# Patient Record
Sex: MALE | Race: ASIAN | NOT HISPANIC OR LATINO | URBAN - METROPOLITAN AREA
[De-identification: names, ages, dates, MRNs, and addresses within clinical notes are randomized per-mention and may not be internally consistent; named-entity substitution may affect disease eponyms.]

---

## 2017-04-19 ENCOUNTER — EMERGENCY (EMERGENCY)
Facility: HOSPITAL | Age: 37
LOS: 1 days | Discharge: HOME | End: 2017-04-19
Attending: EMERGENCY MEDICINE | Admitting: EMERGENCY MEDICINE
Payer: COMMERCIAL

## 2017-04-19 VITALS
HEART RATE: 81 BPM | OXYGEN SATURATION: 100 % | TEMPERATURE: 98 F | SYSTOLIC BLOOD PRESSURE: 163 MMHG | RESPIRATION RATE: 18 BRPM | DIASTOLIC BLOOD PRESSURE: 100 MMHG

## 2017-04-19 VITALS
DIASTOLIC BLOOD PRESSURE: 104 MMHG | TEMPERATURE: 98 F | SYSTOLIC BLOOD PRESSURE: 126 MMHG | OXYGEN SATURATION: 100 % | RESPIRATION RATE: 16 BRPM | HEART RATE: 73 BPM

## 2017-04-19 DIAGNOSIS — R10.9 UNSPECIFIED ABDOMINAL PAIN: ICD-10-CM

## 2017-04-19 DIAGNOSIS — I10 ESSENTIAL (PRIMARY) HYPERTENSION: ICD-10-CM

## 2017-04-19 DIAGNOSIS — Z79.899 OTHER LONG TERM (CURRENT) DRUG THERAPY: ICD-10-CM

## 2017-04-19 LAB
ALBUMIN SERPL ELPH-MCNC: 4.4 G/DL — SIGNIFICANT CHANGE UP (ref 3.3–5)
ALP SERPL-CCNC: 92 U/L — SIGNIFICANT CHANGE UP (ref 40–120)
ALT FLD-CCNC: 42 U/L RC — SIGNIFICANT CHANGE UP (ref 10–45)
ANION GAP SERPL CALC-SCNC: 17 MMOL/L — SIGNIFICANT CHANGE UP (ref 5–17)
APPEARANCE UR: CLEAR — SIGNIFICANT CHANGE UP
AST SERPL-CCNC: 25 U/L — SIGNIFICANT CHANGE UP (ref 10–40)
BASOPHILS # BLD AUTO: 0.1 K/UL — SIGNIFICANT CHANGE UP (ref 0–0.2)
BASOPHILS NFR BLD AUTO: 0.6 % — SIGNIFICANT CHANGE UP (ref 0–2)
BILIRUB SERPL-MCNC: 0.8 MG/DL — SIGNIFICANT CHANGE UP (ref 0.2–1.2)
BILIRUB UR-MCNC: NEGATIVE — SIGNIFICANT CHANGE UP
BUN SERPL-MCNC: 13 MG/DL — SIGNIFICANT CHANGE UP (ref 7–23)
CALCIUM SERPL-MCNC: 9.6 MG/DL — SIGNIFICANT CHANGE UP (ref 8.4–10.5)
CHLORIDE SERPL-SCNC: 99 MMOL/L — SIGNIFICANT CHANGE UP (ref 96–108)
CO2 SERPL-SCNC: 25 MMOL/L — SIGNIFICANT CHANGE UP (ref 22–31)
COLOR SPEC: SIGNIFICANT CHANGE UP
CREAT SERPL-MCNC: 1.25 MG/DL — SIGNIFICANT CHANGE UP (ref 0.5–1.3)
DIFF PNL FLD: NEGATIVE — SIGNIFICANT CHANGE UP
EOSINOPHIL # BLD AUTO: 0 K/UL — SIGNIFICANT CHANGE UP (ref 0–0.5)
EOSINOPHIL NFR BLD AUTO: 0.3 % — SIGNIFICANT CHANGE UP (ref 0–6)
GAS PNL BLDV: SIGNIFICANT CHANGE UP
GAS PNL BLDV: SIGNIFICANT CHANGE UP
GLUCOSE SERPL-MCNC: 125 MG/DL — HIGH (ref 70–99)
GLUCOSE UR QL: NEGATIVE — SIGNIFICANT CHANGE UP
HCT VFR BLD CALC: 42.9 % — SIGNIFICANT CHANGE UP (ref 39–50)
HGB BLD-MCNC: 15 G/DL — SIGNIFICANT CHANGE UP (ref 13–17)
KETONES UR-MCNC: NEGATIVE — SIGNIFICANT CHANGE UP
LEUKOCYTE ESTERASE UR-ACNC: NEGATIVE — SIGNIFICANT CHANGE UP
LIDOCAIN IGE QN: 41 U/L — SIGNIFICANT CHANGE UP (ref 7–60)
LYMPHOCYTES # BLD AUTO: 43.6 % — SIGNIFICANT CHANGE UP (ref 13–44)
LYMPHOCYTES # BLD AUTO: 5 K/UL — HIGH (ref 1–3.3)
MCHC RBC-ENTMCNC: 29.9 PG — SIGNIFICANT CHANGE UP (ref 27–34)
MCHC RBC-ENTMCNC: 35 GM/DL — SIGNIFICANT CHANGE UP (ref 32–36)
MCV RBC AUTO: 85.4 FL — SIGNIFICANT CHANGE UP (ref 80–100)
MONOCYTES # BLD AUTO: 0.7 K/UL — SIGNIFICANT CHANGE UP (ref 0–0.9)
MONOCYTES NFR BLD AUTO: 6.5 % — SIGNIFICANT CHANGE UP (ref 2–14)
NEUTROPHILS # BLD AUTO: 5.6 K/UL — SIGNIFICANT CHANGE UP (ref 1.8–7.4)
NEUTROPHILS NFR BLD AUTO: 49 % — SIGNIFICANT CHANGE UP (ref 43–77)
NITRITE UR-MCNC: NEGATIVE — SIGNIFICANT CHANGE UP
PH UR: 6.5 — SIGNIFICANT CHANGE UP (ref 5–8)
PLATELET # BLD AUTO: 226 K/UL — SIGNIFICANT CHANGE UP (ref 150–400)
POTASSIUM SERPL-MCNC: 3.4 MMOL/L — LOW (ref 3.5–5.3)
POTASSIUM SERPL-SCNC: 3.4 MMOL/L — LOW (ref 3.5–5.3)
PROT SERPL-MCNC: 7.7 G/DL — SIGNIFICANT CHANGE UP (ref 6–8.3)
PROT UR-MCNC: NEGATIVE — SIGNIFICANT CHANGE UP
RBC # BLD: 5.02 M/UL — SIGNIFICANT CHANGE UP (ref 4.2–5.8)
RBC # FLD: 11.5 % — SIGNIFICANT CHANGE UP (ref 10.3–14.5)
SODIUM SERPL-SCNC: 141 MMOL/L — SIGNIFICANT CHANGE UP (ref 135–145)
SP GR SPEC: 1.02 — SIGNIFICANT CHANGE UP (ref 1.01–1.02)
UROBILINOGEN FLD QL: NEGATIVE — SIGNIFICANT CHANGE UP
WBC # BLD: 11.3 K/UL — HIGH (ref 3.8–10.5)
WBC # FLD AUTO: 11.3 K/UL — HIGH (ref 3.8–10.5)

## 2017-04-19 PROCEDURE — 93010 ELECTROCARDIOGRAM REPORT: CPT

## 2017-04-19 PROCEDURE — 99285 EMERGENCY DEPT VISIT HI MDM: CPT | Mod: 25

## 2017-04-19 PROCEDURE — 71275 CT ANGIOGRAPHY CHEST: CPT | Mod: 26

## 2017-04-19 PROCEDURE — 74174 CTA ABD&PLVS W/CONTRAST: CPT | Mod: 26

## 2017-04-19 RX ORDER — SODIUM CHLORIDE 9 MG/ML
1000 INJECTION, SOLUTION INTRAVENOUS ONCE
Qty: 0 | Refills: 0 | Status: COMPLETED | OUTPATIENT
Start: 2017-04-19 | End: 2017-04-19

## 2017-04-19 RX ORDER — HYDROMORPHONE HYDROCHLORIDE 2 MG/ML
1 INJECTION INTRAMUSCULAR; INTRAVENOUS; SUBCUTANEOUS ONCE
Qty: 0 | Refills: 0 | Status: DISCONTINUED | OUTPATIENT
Start: 2017-04-19 | End: 2017-04-19

## 2017-04-19 RX ORDER — MORPHINE SULFATE 50 MG/1
4 CAPSULE, EXTENDED RELEASE ORAL ONCE
Qty: 0 | Refills: 0 | Status: DISCONTINUED | OUTPATIENT
Start: 2017-04-19 | End: 2017-04-19

## 2017-04-19 RX ORDER — ACETAMINOPHEN 500 MG
1000 TABLET ORAL ONCE
Qty: 0 | Refills: 0 | Status: COMPLETED | OUTPATIENT
Start: 2017-04-19 | End: 2017-04-19

## 2017-04-19 RX ORDER — POTASSIUM CHLORIDE 20 MEQ
20 PACKET (EA) ORAL ONCE
Qty: 0 | Refills: 0 | Status: COMPLETED | OUTPATIENT
Start: 2017-04-19 | End: 2017-04-19

## 2017-04-19 RX ADMIN — SODIUM CHLORIDE 2000 MILLILITER(S): 9 INJECTION, SOLUTION INTRAVENOUS at 06:40

## 2017-04-19 RX ADMIN — Medication 1000 MILLIGRAM(S): at 04:27

## 2017-04-19 RX ADMIN — Medication 400 MILLIGRAM(S): at 03:23

## 2017-04-19 RX ADMIN — SODIUM CHLORIDE 4000 MILLILITER(S): 9 INJECTION, SOLUTION INTRAVENOUS at 03:24

## 2017-04-19 RX ADMIN — MORPHINE SULFATE 4 MILLIGRAM(S): 50 CAPSULE, EXTENDED RELEASE ORAL at 06:04

## 2017-04-19 RX ADMIN — HYDROMORPHONE HYDROCHLORIDE 1 MILLIGRAM(S): 2 INJECTION INTRAMUSCULAR; INTRAVENOUS; SUBCUTANEOUS at 04:27

## 2017-04-19 RX ADMIN — MORPHINE SULFATE 4 MILLIGRAM(S): 50 CAPSULE, EXTENDED RELEASE ORAL at 03:24

## 2017-04-19 RX ADMIN — HYDROMORPHONE HYDROCHLORIDE 1 MILLIGRAM(S): 2 INJECTION INTRAMUSCULAR; INTRAVENOUS; SUBCUTANEOUS at 04:22

## 2017-04-19 RX ADMIN — Medication 20 MILLIEQUIVALENT(S): at 07:40

## 2017-04-19 RX ADMIN — SODIUM CHLORIDE 4000 MILLILITER(S): 9 INJECTION, SOLUTION INTRAVENOUS at 04:22

## 2017-04-19 NOTE — ED PROCEDURE NOTE - PROCEDURE ADDITIONAL DETAILS
Emergency Department Focused Ultrasound performed at patient's bedside for educational purposes. The study will have a follow up study performed or was performed in the direct supervision of an ultrasound trained attending.       Renal US: no hydronephrosis bilaterally

## 2017-04-19 NOTE — ED ADULT NURSE NOTE - OBJECTIVE STATEMENT
37 y.o M arrived to ED c/o sudden onset abd pain 10/10 at 11PM last night. A&Ox3. Pt states on Saturday he had an episode of abdominal pain over midepigastric area, took zantac and pain was relieved. Today pain began in RLQ nonradiating, took zantac with no relief. PMH HTN. Respirations nonlabored, +abdominal guarding, abdomen soft nondistended, abd pain not increased by plaption, no CVA tenderess, neuro intact, moves all extremities. Denies CP, SOB, N/V/D, fevers, chills, HA, dizziness, vision changes, dysuria, hematuria. 37 y.o M arrived to ED c/o sudden onset abd pain 10/10 at 11PM last night. A&Ox3. Pt states on Saturday he had an episode of abdominal pain over midepigastric area, took zantac and pain was relieved. Today pain began in RLQ non radiating, took zantac with no relief. PMH HTN. Respirations nonlabored, +abdominal guarding, abdomen soft nondistended, abd pain not increased by palpation, no CVA tenderness, neuro intact, moves all extremities. Denies CP, SOB, N/V/D, fevers, chills, HA, dizziness, vision changes, dysuria, hematuria.

## 2017-04-19 NOTE — ED PROVIDER NOTE - PHYSICAL EXAMINATION
Attending MD Broussard: A & O x 3, writhing in pain, holding right side of the abdomen, HEENT WNL and no facial asymmetry; lungs CTAB, heart with reg rhythm without murmur; abdomen soft NTND; extremities with no edema; neuro exam non focal with no motor or sensory deficits. peripheral pulses full and equal in bilateral upper and lower extremities

## 2017-04-19 NOTE — ED PROVIDER NOTE - PROGRESS NOTE DETAILS
patient laying comfortably in bed, pain resolved, after 2mg dilaudid and 4mg morphine, CTA w/o any acute findings, labs wnl except for mild leukocytosis/hypokalemia, elevated lactate of 2.7 pending UA- no stone visualized or hydronephrosis on CT. unclear etiology of pain will repeat VBG after 3L fluid -Slowey DO patient laying comfortably in bed, pain resolved, after 1mg dilaudid and 4mg morphine, CTA w/o any acute findings, labs wnl except for mild leukocytosis/hypokalemia, elevated lactate of 2.7 pending UA- no stone visualized or hydronephrosis on CT. unclear etiology of pain will repeat VBG after 3L fluid -Slowey DO Attending MD Broussard: patient feeling much better. CTA normal, labs unrevealing, lactate downtrended after IV fluids. Abdomen nontender, will PO challenge, if does well will discharge with PMD follow up Dr. Cordova Note: s/o from Dr. Broussard pending reassessment.  Pt tolerated PO, repeat abdominal exam completely benign, had patient walk around the ER and still with no pain for past 3 hours.  Pt stable for dc home with strict instructions to return if pain returns.

## 2017-04-19 NOTE — ED ADULT NURSE REASSESSMENT NOTE - COMFORT CARE
po fluids offered/plan of care explained
po fluids offered/wait time explained/plan of care explained

## 2017-04-19 NOTE — ED ADULT NURSE REASSESSMENT NOTE - NS ED NURSE REASSESS COMMENT FT1
Pt resting comfortably denies pain at this time. VS stable, in no acute distress. Pt pending repeat VBG after 3rd LR bolus administration
pt A&Ox3 resting comfortably in bed. pt reports partial relief of pain 2/10. VSS. plan of care discussed with patient. safety and comfort maintained.
VSS. A&Ox4. Pt tolerated PO fluids appropriately. pt comfortable, denies pain. plan of care discussed with pt. safety and comfort measures maintained.

## 2017-04-19 NOTE — ED PROVIDER NOTE - ATTENDING CONTRIBUTION TO CARE
Attending MD Broussard:  I personally have seen and examined this patient.  Resident note reviewed and agree on plan of care and except where noted.  See HPI, PE, and MDM for details.

## 2017-04-19 NOTE — ED PROVIDER NOTE - MEDICAL DECISION MAKING DETAILS
Attending MD Broussard: 37M with HTN presenting with severe right flank and right abdominal pain, nontender abdomen, ddx includes renal colic, consider aortic dissection as well given h/o HTN, will obtain CTA to ro dissection and eval for kidney stone

## 2017-04-20 ENCOUNTER — RESULT REVIEW (OUTPATIENT)
Age: 37
End: 2017-04-20

## 2017-04-20 ENCOUNTER — INPATIENT (INPATIENT)
Facility: HOSPITAL | Age: 37
LOS: 3 days | Discharge: HOME | DRG: 419 | End: 2017-04-24
Attending: SURGERY | Admitting: SURGERY
Payer: COMMERCIAL

## 2017-04-20 VITALS
DIASTOLIC BLOOD PRESSURE: 118 MMHG | SYSTOLIC BLOOD PRESSURE: 171 MMHG | OXYGEN SATURATION: 98 % | HEART RATE: 90 BPM | TEMPERATURE: 99 F | RESPIRATION RATE: 18 BRPM

## 2017-04-20 DIAGNOSIS — K29.70 GASTRITIS, UNSPECIFIED, WITHOUT BLEEDING: ICD-10-CM

## 2017-04-20 DIAGNOSIS — Z98.890 OTHER SPECIFIED POSTPROCEDURAL STATES: Chronic | ICD-10-CM

## 2017-04-20 LAB
ALBUMIN SERPL ELPH-MCNC: 4.4 G/DL — SIGNIFICANT CHANGE UP (ref 3.3–5)
ALP SERPL-CCNC: 91 U/L — SIGNIFICANT CHANGE UP (ref 40–120)
ALT FLD-CCNC: 40 U/L RC — SIGNIFICANT CHANGE UP (ref 10–45)
ANION GAP SERPL CALC-SCNC: 14 MMOL/L — SIGNIFICANT CHANGE UP (ref 5–17)
AST SERPL-CCNC: 25 U/L — SIGNIFICANT CHANGE UP (ref 10–40)
BASOPHILS # BLD AUTO: 0.1 K/UL — SIGNIFICANT CHANGE UP (ref 0–0.2)
BASOPHILS NFR BLD AUTO: 0.4 % — SIGNIFICANT CHANGE UP (ref 0–2)
BILIRUB SERPL-MCNC: 0.8 MG/DL — SIGNIFICANT CHANGE UP (ref 0.2–1.2)
BUN SERPL-MCNC: 11 MG/DL — SIGNIFICANT CHANGE UP (ref 7–23)
CALCIUM SERPL-MCNC: 9.5 MG/DL — SIGNIFICANT CHANGE UP (ref 8.4–10.5)
CHLORIDE SERPL-SCNC: 104 MMOL/L — SIGNIFICANT CHANGE UP (ref 96–108)
CO2 SERPL-SCNC: 28 MMOL/L — SIGNIFICANT CHANGE UP (ref 22–31)
CREAT SERPL-MCNC: 1.32 MG/DL — HIGH (ref 0.5–1.3)
CULTURE RESULTS: NO GROWTH — SIGNIFICANT CHANGE UP
EOSINOPHIL # BLD AUTO: 0 K/UL — SIGNIFICANT CHANGE UP (ref 0–0.5)
EOSINOPHIL NFR BLD AUTO: 0.3 % — SIGNIFICANT CHANGE UP (ref 0–6)
GAS PNL BLDV: SIGNIFICANT CHANGE UP
GAS PNL BLDV: SIGNIFICANT CHANGE UP
GLUCOSE SERPL-MCNC: 117 MG/DL — HIGH (ref 70–99)
HCT VFR BLD CALC: 40.8 % — SIGNIFICANT CHANGE UP (ref 39–50)
HGB BLD-MCNC: 14.9 G/DL — SIGNIFICANT CHANGE UP (ref 13–17)
LIDOCAIN IGE QN: 33 U/L — SIGNIFICANT CHANGE UP (ref 7–60)
LYMPHOCYTES # BLD AUTO: 17.7 % — SIGNIFICANT CHANGE UP (ref 13–44)
LYMPHOCYTES # BLD AUTO: 2.3 K/UL — SIGNIFICANT CHANGE UP (ref 1–3.3)
MCHC RBC-ENTMCNC: 31.3 PG — SIGNIFICANT CHANGE UP (ref 27–34)
MCHC RBC-ENTMCNC: 36.6 GM/DL — HIGH (ref 32–36)
MCV RBC AUTO: 85.4 FL — SIGNIFICANT CHANGE UP (ref 80–100)
MONOCYTES # BLD AUTO: 0.7 K/UL — SIGNIFICANT CHANGE UP (ref 0–0.9)
MONOCYTES NFR BLD AUTO: 5.7 % — SIGNIFICANT CHANGE UP (ref 2–14)
NEUTROPHILS # BLD AUTO: 9.7 K/UL — HIGH (ref 1.8–7.4)
NEUTROPHILS NFR BLD AUTO: 75.8 % — SIGNIFICANT CHANGE UP (ref 43–77)
PLATELET # BLD AUTO: 204 K/UL — SIGNIFICANT CHANGE UP (ref 150–400)
POTASSIUM SERPL-MCNC: 3.6 MMOL/L — SIGNIFICANT CHANGE UP (ref 3.5–5.3)
POTASSIUM SERPL-SCNC: 3.6 MMOL/L — SIGNIFICANT CHANGE UP (ref 3.5–5.3)
PROT SERPL-MCNC: 7.6 G/DL — SIGNIFICANT CHANGE UP (ref 6–8.3)
RBC # BLD: 4.77 M/UL — SIGNIFICANT CHANGE UP (ref 4.2–5.8)
RBC # FLD: 11.4 % — SIGNIFICANT CHANGE UP (ref 10.3–14.5)
SODIUM SERPL-SCNC: 146 MMOL/L — HIGH (ref 135–145)
SPECIMEN SOURCE: SIGNIFICANT CHANGE UP
TROPONIN T SERPL-MCNC: <0.01 NG/ML — SIGNIFICANT CHANGE UP (ref 0–0.06)
WBC # BLD: 12.7 K/UL — HIGH (ref 3.8–10.5)
WBC # FLD AUTO: 12.7 K/UL — HIGH (ref 3.8–10.5)

## 2017-04-20 PROCEDURE — 71010: CPT | Mod: 26

## 2017-04-20 PROCEDURE — 93010 ELECTROCARDIOGRAM REPORT: CPT

## 2017-04-20 PROCEDURE — 99236 HOSP IP/OBS SAME DATE HI 85: CPT | Mod: 25

## 2017-04-20 PROCEDURE — 78227 HEPATOBIL SYST IMAGE W/DRUG: CPT | Mod: 26

## 2017-04-20 PROCEDURE — 99221 1ST HOSP IP/OBS SF/LOW 40: CPT | Mod: 57

## 2017-04-20 PROCEDURE — 76700 US EXAM ABDOM COMPLETE: CPT | Mod: 26

## 2017-04-20 RX ORDER — MORPHINE SULFATE 50 MG/1
4 CAPSULE, EXTENDED RELEASE ORAL ONCE
Qty: 0 | Refills: 0 | Status: DISCONTINUED | OUTPATIENT
Start: 2017-04-20 | End: 2017-04-20

## 2017-04-20 RX ORDER — FAMOTIDINE 10 MG/ML
20 INJECTION INTRAVENOUS ONCE
Qty: 0 | Refills: 0 | Status: COMPLETED | OUTPATIENT
Start: 2017-04-20 | End: 2017-04-20

## 2017-04-20 RX ORDER — SODIUM CHLORIDE 9 MG/ML
1000 INJECTION INTRAMUSCULAR; INTRAVENOUS; SUBCUTANEOUS ONCE
Qty: 0 | Refills: 0 | Status: COMPLETED | OUTPATIENT
Start: 2017-04-20 | End: 2017-04-20

## 2017-04-20 RX ORDER — HYDROMORPHONE HYDROCHLORIDE 2 MG/ML
1 INJECTION INTRAMUSCULAR; INTRAVENOUS; SUBCUTANEOUS ONCE
Qty: 0 | Refills: 0 | Status: DISCONTINUED | OUTPATIENT
Start: 2017-04-20 | End: 2017-04-20

## 2017-04-20 RX ORDER — CEFOTETAN DISODIUM 1 G
2 VIAL (EA) INJECTION EVERY 12 HOURS
Qty: 0 | Refills: 0 | Status: DISCONTINUED | OUTPATIENT
Start: 2017-04-20 | End: 2017-04-21

## 2017-04-20 RX ORDER — PANTOPRAZOLE SODIUM 20 MG/1
40 TABLET, DELAYED RELEASE ORAL ONCE
Qty: 0 | Refills: 0 | Status: COMPLETED | OUTPATIENT
Start: 2017-04-20 | End: 2017-04-20

## 2017-04-20 RX ORDER — LIDOCAINE 4 G/100G
10 CREAM TOPICAL ONCE
Qty: 0 | Refills: 0 | Status: COMPLETED | OUTPATIENT
Start: 2017-04-20 | End: 2017-04-20

## 2017-04-20 RX ORDER — ACETAMINOPHEN 500 MG
1000 TABLET ORAL ONCE
Qty: 0 | Refills: 0 | Status: COMPLETED | OUTPATIENT
Start: 2017-04-20 | End: 2017-04-20

## 2017-04-20 RX ORDER — HYDROMORPHONE HYDROCHLORIDE 2 MG/ML
1 INJECTION INTRAMUSCULAR; INTRAVENOUS; SUBCUTANEOUS
Qty: 0 | Refills: 0 | Status: DISCONTINUED | OUTPATIENT
Start: 2017-04-20 | End: 2017-04-21

## 2017-04-20 RX ORDER — SODIUM CHLORIDE 9 MG/ML
1000 INJECTION, SOLUTION INTRAVENOUS
Qty: 0 | Refills: 0 | Status: DISCONTINUED | OUTPATIENT
Start: 2017-04-20 | End: 2017-04-21

## 2017-04-20 RX ORDER — ENOXAPARIN SODIUM 100 MG/ML
40 INJECTION SUBCUTANEOUS DAILY
Qty: 0 | Refills: 0 | Status: DISCONTINUED | OUTPATIENT
Start: 2017-04-20 | End: 2017-04-21

## 2017-04-20 RX ORDER — SUCRALFATE 1 G
1 TABLET ORAL EVERY 6 HOURS
Qty: 0 | Refills: 0 | Status: DISCONTINUED | OUTPATIENT
Start: 2017-04-20 | End: 2017-04-20

## 2017-04-20 RX ORDER — LOSARTAN POTASSIUM 100 MG/1
100 TABLET, FILM COATED ORAL DAILY
Qty: 0 | Refills: 0 | Status: DISCONTINUED | OUTPATIENT
Start: 2017-04-20 | End: 2017-04-21

## 2017-04-20 RX ORDER — HYDROMORPHONE HYDROCHLORIDE 2 MG/ML
0.5 INJECTION INTRAMUSCULAR; INTRAVENOUS; SUBCUTANEOUS
Qty: 0 | Refills: 0 | Status: DISCONTINUED | OUTPATIENT
Start: 2017-04-20 | End: 2017-04-21

## 2017-04-20 RX ADMIN — HYDROMORPHONE HYDROCHLORIDE 1 MILLIGRAM(S): 2 INJECTION INTRAMUSCULAR; INTRAVENOUS; SUBCUTANEOUS at 12:50

## 2017-04-20 RX ADMIN — SODIUM CHLORIDE 150 MILLILITER(S): 9 INJECTION, SOLUTION INTRAVENOUS at 13:47

## 2017-04-20 RX ADMIN — SODIUM CHLORIDE 2000 MILLILITER(S): 9 INJECTION INTRAMUSCULAR; INTRAVENOUS; SUBCUTANEOUS at 02:29

## 2017-04-20 RX ADMIN — SODIUM CHLORIDE 2000 MILLILITER(S): 9 INJECTION INTRAMUSCULAR; INTRAVENOUS; SUBCUTANEOUS at 01:51

## 2017-04-20 RX ADMIN — PANTOPRAZOLE SODIUM 40 MILLIGRAM(S): 20 TABLET, DELAYED RELEASE ORAL at 06:11

## 2017-04-20 RX ADMIN — HYDROMORPHONE HYDROCHLORIDE 1 MILLIGRAM(S): 2 INJECTION INTRAMUSCULAR; INTRAVENOUS; SUBCUTANEOUS at 02:20

## 2017-04-20 RX ADMIN — HYDROMORPHONE HYDROCHLORIDE 1 MILLIGRAM(S): 2 INJECTION INTRAMUSCULAR; INTRAVENOUS; SUBCUTANEOUS at 20:45

## 2017-04-20 RX ADMIN — Medication 1 GRAM(S): at 12:36

## 2017-04-20 RX ADMIN — Medication 1 GRAM(S): at 06:26

## 2017-04-20 RX ADMIN — FAMOTIDINE 20 MILLIGRAM(S): 10 INJECTION INTRAVENOUS at 12:36

## 2017-04-20 RX ADMIN — Medication 1000 MILLIGRAM(S): at 22:32

## 2017-04-20 RX ADMIN — Medication 400 MILLIGRAM(S): at 22:02

## 2017-04-20 RX ADMIN — LIDOCAINE 10 MILLILITER(S): 4 CREAM TOPICAL at 02:58

## 2017-04-20 RX ADMIN — Medication 100 GRAM(S): at 22:32

## 2017-04-20 RX ADMIN — Medication 30 MILLILITER(S): at 02:58

## 2017-04-20 RX ADMIN — LOSARTAN POTASSIUM 100 MILLIGRAM(S): 100 TABLET, FILM COATED ORAL at 07:45

## 2017-04-20 RX ADMIN — HYDROMORPHONE HYDROCHLORIDE 1 MILLIGRAM(S): 2 INJECTION INTRAMUSCULAR; INTRAVENOUS; SUBCUTANEOUS at 01:50

## 2017-04-20 RX ADMIN — Medication 400 MILLIGRAM(S): at 15:58

## 2017-04-20 RX ADMIN — HYDROMORPHONE HYDROCHLORIDE 1 MILLIGRAM(S): 2 INJECTION INTRAMUSCULAR; INTRAVENOUS; SUBCUTANEOUS at 04:03

## 2017-04-20 RX ADMIN — FAMOTIDINE 20 MILLIGRAM(S): 10 INJECTION INTRAVENOUS at 02:58

## 2017-04-20 RX ADMIN — HYDROMORPHONE HYDROCHLORIDE 1 MILLIGRAM(S): 2 INJECTION INTRAMUSCULAR; INTRAVENOUS; SUBCUTANEOUS at 06:11

## 2017-04-20 RX ADMIN — Medication 400 MILLIGRAM(S): at 06:11

## 2017-04-20 RX ADMIN — SODIUM CHLORIDE 150 MILLILITER(S): 9 INJECTION, SOLUTION INTRAVENOUS at 06:56

## 2017-04-20 RX ADMIN — MORPHINE SULFATE 4 MILLIGRAM(S): 50 CAPSULE, EXTENDED RELEASE ORAL at 09:18

## 2017-04-20 RX ADMIN — HYDROMORPHONE HYDROCHLORIDE 1 MILLIGRAM(S): 2 INJECTION INTRAMUSCULAR; INTRAVENOUS; SUBCUTANEOUS at 21:00

## 2017-04-20 NOTE — ED ADULT NURSE NOTE - OBJECTIVE STATEMENT
pt was seen yesterday morning in ED for abd. pain and was d/c from the department. pt c/o "sudden of abd. pain again after dinner that is worse than how felt yesterday." pt denies n/v/d.

## 2017-04-20 NOTE — ED ADULT NURSE REASSESSMENT NOTE - NS ED NURSE REASSESS COMMENT FT1
Received pt from Di STRANGE  from ER , received pt alert and responsive, oriented x4, denies any respiratory distress, SOB, or difficulty breathing. Pt transferred to CDU for observation for epigastric pain, IV in place currently infusing /hr, patent and free of signs of infiltration. Pt bp 163/110, Temp 99.1 pt c/o 5/10 abdominal pain at this time. Pt educated on unit and unit rules, instructed patient to notify RN of any needed assistance, Pt verbalizes understanding, Call bell placed within reach. Safety maintained. Will continue to monitor.

## 2017-04-20 NOTE — ED CDU PROVIDER NOTE - PROGRESS NOTE DETAILS
Pt c/o abdominal pain. Vitals stable. Morphine given. Will continue to monitor. RUQ tender. Will order RUQ ultrasound. D/W Dr. Quiñonez. -Alicja Pruett PA-C Pt continues to c/o abdominal pain. Dilaudid given. US shows gallstones with small amount of sludge.  D/W Dr. Quiñonez. Will call surgery for a consult. -Alicja Pruett PA-C Surgery resident saw pt and recommends admission. Will admit to Dr. Zaid Pereira. D/w Dr. Olamide Pruett PA-C CDU Attending -- Patient seen and examined this morning; case followed throughout the day.  Imaging noted.  Surgery attending agrees exam c/w biliary consult.  Will admit for pain control, possible OR in the AM.  --BMM

## 2017-04-20 NOTE — H&P ADULT. - ASSESSMENT
37M w/ acute cholecystitis  -NPO  -IVF  -Cefotetan  -Will book for OR tomorrow  -Admit to [facility]  -D/w attending

## 2017-04-20 NOTE — ED PROVIDER NOTE - PHYSICAL EXAMINATION
Gen: severe distress in fetal position/moaning, AOx3  Head: NCAT  HEENT: PERRL, oral mucosa dry, normal conjunctiva, neck supple  Lung: CTAB, no respiratory distress  CV: rrr, no murmur, Normal perfusion  Abd: soft, NTND, no CVA tenderness  MSK: No edema, no visible deformities  Neuro: No focal neurologic deficits  Skin: No rash   Psych: normal affect Gen: severe distress in fetal position/moaning, AOx3  Head: NCAT  HEENT: PERRL, oral mucosa dry, normal conjunctiva, neck supple  Lung: CTAB, no respiratory distress  CV: rrr, no murmur, Normal perfusion  Abd: soft, NTND, no CVA tenderness  : Chaperone Dr. Salgado, normal lie no ttp or swelling  MSK: No edema, no visible deformities  Neuro: No focal neurologic deficits  Skin: No rash   Psych: normal affect

## 2017-04-20 NOTE — ED ADULT NURSE NOTE - CHPI ED SYMPTOMS NEG
no blood in stool/no vomiting/no burning urination/no nausea/no diarrhea/no fever/no chills/no abdominal distension

## 2017-04-20 NOTE — ED CDU PROVIDER NOTE - MEDICAL DECISION MAKING DETAILS
36 yo with no med hx comes in with worsening abd pain over the past few days, was here yesterday for the same reason and was dc'd after having normal Ct angio, abd/pel, coming back with worsening pain. n/v/, no f.c EXAM: no point tenderness, no generalized abd pain. PLAN: continue to hydrate, and serial abd exams.

## 2017-04-20 NOTE — ED PROVIDER NOTE - ATTENDING CONTRIBUTION TO CARE
36yo M with visit to ED this AM for same thing, severe rt sided abd pain, sharp/bloating, was okay throughout day worsening since 7pm, now severe, no nausea, vomiting. no diarrhea. no fever/chills.   ROS: no CP/SOB. no cough. no n/v/d/c. no rash. no bleeding. no urinary complaints. no weakness. no vision changes. no HA. no neck/back pain. no extremity swelling.   Pt ruled out for aneurysm/dissection/ischemia today, had elevated lactate on discharge still, potentially PUD/gastritis. will repeat labs, try GI cocktail, EKG to r/o cardiac etiology with epigastric pain and reasses

## 2017-04-20 NOTE — ED ADULT NURSE NOTE - ED STAT RN HANDOFF DETAILS
Report called to CDU ALEXANDR Julien. Report given to Muriel STRANGE  regarding pt diagnosis, presenting problems, past and present medical history, medications given, completed interventions and exams results, pending medications  and next dose due, labs, exams and interventions. Pt resting on stretcher comfortably. Safety mantained.

## 2017-04-20 NOTE — H&P ADULT. - ATTENDING COMMENTS
37M with 1 day RUQ pain.  RUQ tender  WBC = 13, LFTs wnl.  RUQ U/S - gallstones but no other signs of acute cholecystitis, CBD 4mm    Assessment - acute calculous cholecystitis  Plan - admit for cholecystectomy

## 2017-04-20 NOTE — ED CDU PROVIDER NOTE - PLAN OF CARE
1. Stay hydrated. Rest. start with clear diet, accelerate to small bland diet as tolerated.   2. Continue Current Home Medications per routine. Take Protonix 20mg 2x/day. Take Carafate 1g 3x/day.   3. Follow up with your PCP in 1-2 days. Follow up with Gastroenterologist #[phone] in 2-3 days. Bring Printed Results.  4. Return if symptoms worsen, fever, weakness, inability to eat/drink, dizziness and all other concerns.

## 2017-04-20 NOTE — ED ADULT NURSE REASSESSMENT NOTE - NS ED NURSE REASSESS COMMENT FT1
As per nucmed morphine 4 mg given. As per nucmed morphine 4 mg given- report update given to Muriel STRANGE 2 katherine

## 2017-04-20 NOTE — ED ADULT NURSE REASSESSMENT NOTE - NS ED NURSE REASSESS COMMENT FT1
Pt reports abd pain 10/10 with no relief of medications provided. VIRGINIE Kennedy and Alicja made aware. Blood pressure elevated. Valium and coozar given as ordered with minimal relief of 8/10. Pt seen moaning 1.5 hours later and reports pain of 10/10, Alicja RACHEL made aware and morphine ordered. Will continue to monitor and provide support. IV patent, fluids running.

## 2017-04-20 NOTE — ED ADULT NURSE REASSESSMENT NOTE - NS ED NURSE REASSESS COMMENT FT1
Patient medicated for pain per MD orders. Patient remains HTN, MD aware - states ok to go to CDU with pressure as is. Takes losartan 100mg at noon daily and states he has been compliant with meds.

## 2017-04-20 NOTE — ED PROVIDER NOTE - MEDICAL DECISION MAKING DETAILS
38yo M with visit to ED this AM for same thing, severe rt sided abd pain, sharp/bloating, was okay throughout day worsening since 7pm, now severe, no nausea, vomiting. no diarrhea. no fever/chills.   ROS: no CP/SOB. no cough. no n/v/d/c. no rash. no bleeding. no urinary complaints. no weakness. no vision changes. no HA. no neck/back pain. no extremity swelling.   Pt ruled out for aneurysm/dissection/ischemia today, had elevated lactate on discharge still, will exam testicles for other possible etiology at this time. 36yo M with visit to ED this AM for same thing, severe rt sided abd pain, sharp/bloating, was okay throughout day worsening since 7pm, now severe, no nausea, vomiting. no diarrhea. no fever/chills.   ROS: no CP/SOB. no cough. no n/v/d/c. no rash. no bleeding. no urinary complaints. no weakness. no vision changes. no HA. no neck/back pain. no extremity swelling.   Pt ruled out for aneurysm/dissection/ischemia today, had elevated lactate on discharge still, potentially PUD/gastritis. will repeat labs, try GI cocktail, EKG to r/o cardiac etiology with epigastric pain and reasses

## 2017-04-20 NOTE — H&P ADULT. - HISTORY OF PRESENT ILLNESS
37M presents c/o x2 days of persistent severe upper epigastric/RUQ abdominal pain. Had similar pain sometime in the past but nowhere near this severe. Denies f/c/vomiting. CTA initially performed that ruled out aortic dissection. RUQ sono performed significant for gallstones and sludge.

## 2017-04-20 NOTE — ED CDU PROVIDER NOTE - OBJECTIVE STATEMENT
36 y/o M h/o HTN, Gastritis c/o severe epigastric pain. Pt reports intermittent epigastric pain over past several months, treated with Zantac. Pain worsened starting 4 days ago- tx with Zantac but pain continued to worsen. came to ED yesterday with c/o same pain. Had CTA chest and abdomen- negative for dissection. received GI medication in ED which relieved symptoms, went home. pt was doing well, until ate Pork chop, which triggered repeat of severe epigastric pain. pain currently 10/10.   Had Endoscopy 4 months ago- normal.  denies fever, dizziness, weakness, n/v/d, dysuria, hematuria, URI symptoms, cp, sob/dyspnea,

## 2017-04-21 ENCOUNTER — TRANSCRIPTION ENCOUNTER (OUTPATIENT)
Age: 37
End: 2017-04-21

## 2017-04-21 LAB
ALBUMIN SERPL ELPH-MCNC: 3.9 G/DL — SIGNIFICANT CHANGE UP (ref 3.3–5)
ALP SERPL-CCNC: 74 U/L — SIGNIFICANT CHANGE UP (ref 40–120)
ALT FLD-CCNC: 29 U/L RC — SIGNIFICANT CHANGE UP (ref 10–45)
ANION GAP SERPL CALC-SCNC: 16 MMOL/L — SIGNIFICANT CHANGE UP (ref 5–17)
AST SERPL-CCNC: 25 U/L — SIGNIFICANT CHANGE UP (ref 10–40)
BILIRUB SERPL-MCNC: 2.2 MG/DL — HIGH (ref 0.2–1.2)
BUN SERPL-MCNC: 11 MG/DL — SIGNIFICANT CHANGE UP (ref 7–23)
CALCIUM SERPL-MCNC: 9 MG/DL — SIGNIFICANT CHANGE UP (ref 8.4–10.5)
CHLORIDE SERPL-SCNC: 100 MMOL/L — SIGNIFICANT CHANGE UP (ref 96–108)
CO2 SERPL-SCNC: 24 MMOL/L — SIGNIFICANT CHANGE UP (ref 22–31)
CREAT SERPL-MCNC: 1.1 MG/DL — SIGNIFICANT CHANGE UP (ref 0.5–1.3)
GLUCOSE SERPL-MCNC: 93 MG/DL — SIGNIFICANT CHANGE UP (ref 70–99)
HCT VFR BLD CALC: 40.2 % — SIGNIFICANT CHANGE UP (ref 39–50)
HGB BLD-MCNC: 14.1 G/DL — SIGNIFICANT CHANGE UP (ref 13–17)
MAGNESIUM SERPL-MCNC: 1.9 MG/DL — SIGNIFICANT CHANGE UP (ref 1.6–2.6)
MCHC RBC-ENTMCNC: 30.9 PG — SIGNIFICANT CHANGE UP (ref 27–34)
MCHC RBC-ENTMCNC: 35 GM/DL — SIGNIFICANT CHANGE UP (ref 32–36)
MCV RBC AUTO: 88.2 FL — SIGNIFICANT CHANGE UP (ref 80–100)
PHOSPHATE SERPL-MCNC: 1.7 MG/DL — LOW (ref 2.5–4.5)
PLATELET # BLD AUTO: 188 K/UL — SIGNIFICANT CHANGE UP (ref 150–400)
POTASSIUM SERPL-MCNC: 3.6 MMOL/L — SIGNIFICANT CHANGE UP (ref 3.5–5.3)
POTASSIUM SERPL-SCNC: 3.6 MMOL/L — SIGNIFICANT CHANGE UP (ref 3.5–5.3)
PROT SERPL-MCNC: 7 G/DL — SIGNIFICANT CHANGE UP (ref 6–8.3)
RBC # BLD: 4.56 M/UL — SIGNIFICANT CHANGE UP (ref 4.2–5.8)
RBC # FLD: 11.3 % — SIGNIFICANT CHANGE UP (ref 10.3–14.5)
SODIUM SERPL-SCNC: 140 MMOL/L — SIGNIFICANT CHANGE UP (ref 135–145)
WBC # BLD: 13.1 K/UL — HIGH (ref 3.8–10.5)
WBC # FLD AUTO: 13.1 K/UL — HIGH (ref 3.8–10.5)

## 2017-04-21 PROCEDURE — 88304 TISSUE EXAM BY PATHOLOGIST: CPT | Mod: 26

## 2017-04-21 PROCEDURE — 47563 LAPARO CHOLECYSTECTOMY/GRAPH: CPT | Mod: GC

## 2017-04-21 RX ORDER — SODIUM CHLORIDE 9 MG/ML
1000 INJECTION, SOLUTION INTRAVENOUS
Qty: 0 | Refills: 0 | Status: DISCONTINUED | OUTPATIENT
Start: 2017-04-21 | End: 2017-04-22

## 2017-04-21 RX ORDER — ENOXAPARIN SODIUM 100 MG/ML
40 INJECTION SUBCUTANEOUS DAILY
Qty: 0 | Refills: 0 | Status: DISCONTINUED | OUTPATIENT
Start: 2017-04-21 | End: 2017-04-24

## 2017-04-21 RX ORDER — HYDROMORPHONE HYDROCHLORIDE 2 MG/ML
0.5 INJECTION INTRAMUSCULAR; INTRAVENOUS; SUBCUTANEOUS
Qty: 0 | Refills: 0 | Status: DISCONTINUED | OUTPATIENT
Start: 2017-04-21 | End: 2017-04-21

## 2017-04-21 RX ORDER — POTASSIUM PHOSPHATE, MONOBASIC POTASSIUM PHOSPHATE, DIBASIC 236; 224 MG/ML; MG/ML
15 INJECTION, SOLUTION INTRAVENOUS ONCE
Qty: 0 | Refills: 0 | Status: DISCONTINUED | OUTPATIENT
Start: 2017-04-21 | End: 2017-04-21

## 2017-04-21 RX ORDER — LOSARTAN POTASSIUM 100 MG/1
100 TABLET, FILM COATED ORAL DAILY
Qty: 0 | Refills: 0 | Status: DISCONTINUED | OUTPATIENT
Start: 2017-04-21 | End: 2017-04-21

## 2017-04-21 RX ORDER — OXYCODONE HYDROCHLORIDE 5 MG/1
5 TABLET ORAL EVERY 4 HOURS
Qty: 0 | Refills: 0 | Status: DISCONTINUED | OUTPATIENT
Start: 2017-04-21 | End: 2017-04-24

## 2017-04-21 RX ORDER — OXYCODONE HYDROCHLORIDE 5 MG/1
10 TABLET ORAL EVERY 4 HOURS
Qty: 0 | Refills: 0 | Status: DISCONTINUED | OUTPATIENT
Start: 2017-04-21 | End: 2017-04-24

## 2017-04-21 RX ADMIN — HYDROMORPHONE HYDROCHLORIDE 1 MILLIGRAM(S): 2 INJECTION INTRAMUSCULAR; INTRAVENOUS; SUBCUTANEOUS at 10:35

## 2017-04-21 RX ADMIN — LOSARTAN POTASSIUM 100 MILLIGRAM(S): 100 TABLET, FILM COATED ORAL at 06:23

## 2017-04-21 RX ADMIN — HYDROMORPHONE HYDROCHLORIDE 1 MILLIGRAM(S): 2 INJECTION INTRAMUSCULAR; INTRAVENOUS; SUBCUTANEOUS at 06:38

## 2017-04-21 RX ADMIN — HYDROMORPHONE HYDROCHLORIDE 1 MILLIGRAM(S): 2 INJECTION INTRAMUSCULAR; INTRAVENOUS; SUBCUTANEOUS at 10:00

## 2017-04-21 RX ADMIN — Medication 100 GRAM(S): at 10:00

## 2017-04-21 RX ADMIN — HYDROMORPHONE HYDROCHLORIDE 1 MILLIGRAM(S): 2 INJECTION INTRAMUSCULAR; INTRAVENOUS; SUBCUTANEOUS at 06:23

## 2017-04-21 NOTE — BRIEF OPERATIVE NOTE - OPERATION/FINDINGS
Gangrenous gallbladder without identifiable structures. Taken top down, opened and cholangiogram shot confirming location of cystic duct. Taken with endoloop.

## 2017-04-21 NOTE — BRIEF OPERATIVE NOTE - PROCEDURE
Cholangiogram  04/21/2017    Active  [id number]  Laparoscopic cholecystectomy  04/21/2017    Active  [id number]

## 2017-04-22 LAB
ANION GAP SERPL CALC-SCNC: 15 MMOL/L — SIGNIFICANT CHANGE UP (ref 5–17)
BUN SERPL-MCNC: 16 MG/DL — SIGNIFICANT CHANGE UP (ref 7–23)
CALCIUM SERPL-MCNC: 8.3 MG/DL — LOW (ref 8.4–10.5)
CHLORIDE SERPL-SCNC: 103 MMOL/L — SIGNIFICANT CHANGE UP (ref 96–108)
CO2 SERPL-SCNC: 22 MMOL/L — SIGNIFICANT CHANGE UP (ref 22–31)
CREAT SERPL-MCNC: 1.06 MG/DL — SIGNIFICANT CHANGE UP (ref 0.5–1.3)
GLUCOSE SERPL-MCNC: 132 MG/DL — HIGH (ref 70–99)
HCT VFR BLD CALC: 34.5 % — LOW (ref 39–50)
HGB BLD-MCNC: 11.5 G/DL — LOW (ref 13–17)
MAGNESIUM SERPL-MCNC: 2.2 MG/DL — SIGNIFICANT CHANGE UP (ref 1.6–2.6)
MCHC RBC-ENTMCNC: 29.6 PG — SIGNIFICANT CHANGE UP (ref 27–34)
MCHC RBC-ENTMCNC: 33.3 GM/DL — SIGNIFICANT CHANGE UP (ref 32–36)
MCV RBC AUTO: 88.9 FL — SIGNIFICANT CHANGE UP (ref 80–100)
PHOSPHATE SERPL-MCNC: 1.7 MG/DL — LOW (ref 2.5–4.5)
PLATELET # BLD AUTO: 212 K/UL — SIGNIFICANT CHANGE UP (ref 150–400)
POTASSIUM SERPL-MCNC: 3.8 MMOL/L — SIGNIFICANT CHANGE UP (ref 3.5–5.3)
POTASSIUM SERPL-SCNC: 3.8 MMOL/L — SIGNIFICANT CHANGE UP (ref 3.5–5.3)
RBC # BLD: 3.88 M/UL — LOW (ref 4.2–5.8)
RBC # FLD: 12.4 % — SIGNIFICANT CHANGE UP (ref 10.3–14.5)
SODIUM SERPL-SCNC: 140 MMOL/L — SIGNIFICANT CHANGE UP (ref 135–145)
WBC # BLD: 9.79 K/UL — SIGNIFICANT CHANGE UP (ref 3.8–10.5)
WBC # FLD AUTO: 9.79 K/UL — SIGNIFICANT CHANGE UP (ref 3.8–10.5)

## 2017-04-22 PROCEDURE — 99024 POSTOP FOLLOW-UP VISIT: CPT

## 2017-04-22 RX ORDER — POTASSIUM PHOSPHATE, MONOBASIC POTASSIUM PHOSPHATE, DIBASIC 236; 224 MG/ML; MG/ML
15 INJECTION, SOLUTION INTRAVENOUS ONCE
Qty: 0 | Refills: 0 | Status: COMPLETED | OUTPATIENT
Start: 2017-04-22 | End: 2017-04-22

## 2017-04-22 RX ORDER — CEFOTETAN DISODIUM 1 G
2 VIAL (EA) INJECTION EVERY 12 HOURS
Qty: 0 | Refills: 0 | Status: DISCONTINUED | OUTPATIENT
Start: 2017-04-22 | End: 2017-04-24

## 2017-04-22 RX ADMIN — OXYCODONE HYDROCHLORIDE 10 MILLIGRAM(S): 5 TABLET ORAL at 03:20

## 2017-04-22 RX ADMIN — OXYCODONE HYDROCHLORIDE 5 MILLIGRAM(S): 5 TABLET ORAL at 09:10

## 2017-04-22 RX ADMIN — OXYCODONE HYDROCHLORIDE 5 MILLIGRAM(S): 5 TABLET ORAL at 17:04

## 2017-04-22 RX ADMIN — Medication 100 GRAM(S): at 05:12

## 2017-04-22 RX ADMIN — OXYCODONE HYDROCHLORIDE 5 MILLIGRAM(S): 5 TABLET ORAL at 17:34

## 2017-04-22 RX ADMIN — Medication 100 GRAM(S): at 17:01

## 2017-04-22 RX ADMIN — ENOXAPARIN SODIUM 40 MILLIGRAM(S): 100 INJECTION SUBCUTANEOUS at 14:12

## 2017-04-22 RX ADMIN — POTASSIUM PHOSPHATE, MONOBASIC POTASSIUM PHOSPHATE, DIBASIC 62.5 MILLIMOLE(S): 236; 224 INJECTION, SOLUTION INTRAVENOUS at 18:25

## 2017-04-22 RX ADMIN — OXYCODONE HYDROCHLORIDE 5 MILLIGRAM(S): 5 TABLET ORAL at 08:40

## 2017-04-22 RX ADMIN — OXYCODONE HYDROCHLORIDE 10 MILLIGRAM(S): 5 TABLET ORAL at 02:50

## 2017-04-23 LAB
ALBUMIN SERPL ELPH-MCNC: 3.2 G/DL — LOW (ref 3.3–5)
ALP SERPL-CCNC: 58 U/L — SIGNIFICANT CHANGE UP (ref 40–120)
ALT FLD-CCNC: 22 U/L — SIGNIFICANT CHANGE UP (ref 10–45)
ANION GAP SERPL CALC-SCNC: 14 MMOL/L — SIGNIFICANT CHANGE UP (ref 5–17)
AST SERPL-CCNC: 20 U/L — SIGNIFICANT CHANGE UP (ref 10–40)
BILIRUB DIRECT SERPL-MCNC: 0.1 MG/DL — SIGNIFICANT CHANGE UP (ref 0–0.2)
BILIRUB INDIRECT FLD-MCNC: 0.1 MG/DL — LOW (ref 0.2–1)
BILIRUB SERPL-MCNC: 0.2 MG/DL — SIGNIFICANT CHANGE UP (ref 0.2–1.2)
BUN SERPL-MCNC: 11 MG/DL — SIGNIFICANT CHANGE UP (ref 7–23)
CALCIUM SERPL-MCNC: 8.4 MG/DL — SIGNIFICANT CHANGE UP (ref 8.4–10.5)
CHLORIDE SERPL-SCNC: 105 MMOL/L — SIGNIFICANT CHANGE UP (ref 96–108)
CO2 SERPL-SCNC: 23 MMOL/L — SIGNIFICANT CHANGE UP (ref 22–31)
CREAT SERPL-MCNC: 1.05 MG/DL — SIGNIFICANT CHANGE UP (ref 0.5–1.3)
GLUCOSE SERPL-MCNC: 84 MG/DL — SIGNIFICANT CHANGE UP (ref 70–99)
MAGNESIUM SERPL-MCNC: 2 MG/DL — SIGNIFICANT CHANGE UP (ref 1.6–2.6)
PHOSPHATE SERPL-MCNC: 2.4 MG/DL — LOW (ref 2.5–4.5)
POTASSIUM SERPL-MCNC: 3.6 MMOL/L — SIGNIFICANT CHANGE UP (ref 3.5–5.3)
POTASSIUM SERPL-SCNC: 3.6 MMOL/L — SIGNIFICANT CHANGE UP (ref 3.5–5.3)
PROT SERPL-MCNC: 6.2 G/DL — SIGNIFICANT CHANGE UP (ref 6–8.3)
SODIUM SERPL-SCNC: 142 MMOL/L — SIGNIFICANT CHANGE UP (ref 135–145)

## 2017-04-23 PROCEDURE — 99024 POSTOP FOLLOW-UP VISIT: CPT

## 2017-04-23 RX ORDER — LOSARTAN POTASSIUM 100 MG/1
100 TABLET, FILM COATED ORAL DAILY
Qty: 0 | Refills: 0 | Status: DISCONTINUED | OUTPATIENT
Start: 2017-04-23 | End: 2017-04-24

## 2017-04-23 RX ORDER — OXYCODONE HYDROCHLORIDE 5 MG/1
1 TABLET ORAL
Qty: 30 | Refills: 0 | OUTPATIENT
Start: 2017-04-23 | End: 2017-04-28

## 2017-04-23 RX ORDER — ACETAMINOPHEN 500 MG
650 TABLET ORAL ONCE
Qty: 0 | Refills: 0 | Status: COMPLETED | OUTPATIENT
Start: 2017-04-23 | End: 2017-04-23

## 2017-04-23 RX ORDER — ACETAMINOPHEN 500 MG
650 TABLET ORAL ONCE
Qty: 0 | Refills: 0 | Status: DISCONTINUED | OUTPATIENT
Start: 2017-04-23 | End: 2017-04-24

## 2017-04-23 RX ADMIN — LOSARTAN POTASSIUM 100 MILLIGRAM(S): 100 TABLET, FILM COATED ORAL at 21:31

## 2017-04-23 RX ADMIN — Medication 100 GRAM(S): at 17:46

## 2017-04-23 RX ADMIN — Medication 100 GRAM(S): at 06:03

## 2017-04-23 RX ADMIN — ENOXAPARIN SODIUM 40 MILLIGRAM(S): 100 INJECTION SUBCUTANEOUS at 12:41

## 2017-04-23 RX ADMIN — OXYCODONE HYDROCHLORIDE 10 MILLIGRAM(S): 5 TABLET ORAL at 15:39

## 2017-04-23 RX ADMIN — OXYCODONE HYDROCHLORIDE 10 MILLIGRAM(S): 5 TABLET ORAL at 16:05

## 2017-04-23 RX ADMIN — Medication 650 MILLIGRAM(S): at 06:23

## 2017-04-23 NOTE — DISCHARGE NOTE ADULT - PATIENT PORTAL LINK FT
“You can access the [employer] Patient Portal, offered by [facility], by registering with the following website: [email or web address]”

## 2017-04-23 NOTE — DISCHARGE NOTE ADULT - MEDICATION SUMMARY - MEDICATIONS TO TAKE
I will START or STAY ON the medications listed below when I get home from the hospital:    oxyCODONE 5 mg oral capsule  -- 1 cap(s) by mouth every 4 hours, As Needed -for severe pain MDD:6  -- Caution federal law prohibits the transfer of this drug to any person other  than the person for whom it was prescribed.  It is very important that you take or use this exactly as directed.  Do not skip doses or discontinue unless directed by your doctor.  May cause drowsiness.  Alcohol may intensify this effect.  Use care when operating dangerous machinery.  This prescription cannot be refilled.  Using more of this medication than prescribed may cause serious breathing problems.    -- Indication: For pain    losartan  --  by mouth   -- Indication: For Hypertension    amoxicillin-clavulanate 875 mg-125 mg oral tablet  -- 1 tab(s) by mouth 2 times a day  -- Finish all this medication unless otherwise directed by prescriber.  Take with food or milk.    -- Indication: For infection I will START or STAY ON the medications listed below when I get home from the hospital:    oxyCODONE 5 mg oral capsule  -- 1 cap(s) by mouth every 4 hours, As Needed -for severe pain MDD:6  -- Caution federal law prohibits the transfer of this drug to any person other  than the person for whom it was prescribed.  It is very important that you take or use this exactly as directed.  Do not skip doses or discontinue unless directed by your doctor.  May cause drowsiness.  Alcohol may intensify this effect.  Use care when operating dangerous machinery.  This prescription cannot be refilled.  Using more of this medication than prescribed may cause serious breathing problems.    -- Indication: For pain    Medi-Tabs 325 mg oral tablet  -- 2 tab(s) by mouth once  -- Indication: For pain control    losartan 100 mg oral tablet  -- 1 tab(s) by mouth once a day  -- Indication: For Hgih blood pressure    amoxicillin-clavulanate 875 mg-125 mg oral tablet  -- 1 tab(s) by mouth 2 times a day MDD:2  -- Finish all this medication unless otherwise directed by prescriber.  Take with food or milk.    -- Indication: For Cholecystitis    pantoprazole 40 mg oral delayed release tablet  -- 1 tab(s) by mouth once a day (before a meal) MDD:1  -- Indication: For acid reflux

## 2017-04-23 NOTE — DISCHARGE NOTE ADULT - CARE PROVIDER_API CALL
Zaid Pereira), Surgery; Surgical Critical Care  [address] [place] [id number]  Phone: [phone]  Fax: [phone]

## 2017-04-23 NOTE — DISCHARGE NOTE ADULT - HOSPITAL COURSE
37M presents c/o x2 days of persistent severe upper epigastric/RUQ abdominal pain. Had similar pain sometime in the past but nowhere near this severe. Denies f/c/vomiting. CTA initially performed that ruled out aortic dissection. RUQ sono performed significant for gallstones and sludge. Pt admitted for acute cholecystitis, IVF, and abx.     HIDA scan: There are several small subcentimeter mobile gallstones and there is a   small amount of sludge. The gallbladder wall is normal. There is no   pericholecystic fluid. There is no sonographic Shipley sign.    Hepatic steatosis with mild focal sparing adjacent to the gallbladder   fossa.    Pt was taken to the OR for laparoscopic cholecystectomy and intraoperative cholangiogram with Dr. Pereira. Please see operative report for further details. Procedure performed without complication. Pt transferred to PACU then floor in stable condition. Hyopophosphatemia and hypokalemia corrected with supplementation. Hospital course was uneventful. At the time of discharge, the patient was hemodynamically stable, was tolerating PO diet, was voiding urine, was ambulating, and was comfortable with adequate pain control. The patient was instructed on managing his PORSCHE drain. The patient was instructed to call Dr. Pereira's office the day after discharge to schedule an appointment the following day (Tuesday 4/25/17).The patient felt comfortable with discharge. The patient was discharged to home. The patient had no other issues.

## 2017-04-23 NOTE — DISCHARGE NOTE ADULT - PLAN OF CARE
Recover from surgery, return to activities of daily living WOUND CARE:  Please keep incisions clean and dry. Please do not Scrub or rub incisions. Do not use lotion or powder on incisions. You are going home with a PORSCHE drain. Please monitor output and record as instructed. Please bring record with you to follow up appointment. Change dressings daily with 4x4 and paper tape.   BATHING: Please do not submerge wound underwater. You may shower and/or sponge bathe.  ACTIVITY: No heavy lifting or straining. Otherwise, you may return to your usual level of physical activity. If you are taking narcotic pain medication (such as Oxycodone) DO NOT drive a car, operate machinery or make important decisions.  DIET: Return to your usual diet.  NOTIFY YOUR SURGEON IF: You have any bleeding that does not stop, any pus draining from your wound(s), any fever (over 100.4 F) or chills, persistent nausea/vomiting, persistent diarrhea, or if your pain is not controlled on your discharge pain medications.  FOLLOW-UP: Please follow up with your primary care physician in one week regarding your hospitalization. Please follow-up with your surgeon, Dr. Pereira, on Tuesday, April 25th, 2017. Call tomorrow, Monday 4/24/17 to schedule your Tuesday appointment.

## 2017-04-23 NOTE — DISCHARGE NOTE ADULT - CARE PLAN
Principal Discharge DX:	Cholecystitis  Goal:	Recover from surgery, return to activities of daily living  Instructions for follow-up, activity and diet:	WOUND CARE:  Please keep incisions clean and dry. Please do not Scrub or rub incisions. Do not use lotion or powder on incisions. You are going home with a PORSCHE drain. Please monitor output and record as instructed. Please bring record with you to follow up appointment. Change dressings daily with 4x4 and paper tape.   BATHING: Please do not submerge wound underwater. You may shower and/or sponge bathe.  ACTIVITY: No heavy lifting or straining. Otherwise, you may return to your usual level of physical activity. If you are taking narcotic pain medication (such as Oxycodone) DO NOT drive a car, operate machinery or make important decisions.  DIET: Return to your usual diet.  NOTIFY YOUR SURGEON IF: You have any bleeding that does not stop, any pus draining from your wound(s), any fever (over 100.4 F) or chills, persistent nausea/vomiting, persistent diarrhea, or if your pain is not controlled on your discharge pain medications.  FOLLOW-UP: Please follow up with your primary care physician in one week regarding your hospitalization. Please follow-up with your surgeon, Dr. Pereira, on Tuesday, April 25th, 2017. Call tomorrow, Monday 4/24/17 to schedule your Tuesday appointment.

## 2017-04-24 VITALS
DIASTOLIC BLOOD PRESSURE: 92 MMHG | HEART RATE: 65 BPM | TEMPERATURE: 98 F | RESPIRATION RATE: 18 BRPM | SYSTOLIC BLOOD PRESSURE: 144 MMHG | OXYGEN SATURATION: 98 %

## 2017-04-24 LAB
ALBUMIN SERPL ELPH-MCNC: 3.1 G/DL — LOW (ref 3.3–5)
ALP SERPL-CCNC: 60 U/L — SIGNIFICANT CHANGE UP (ref 40–120)
ALT FLD-CCNC: 26 U/L RC — SIGNIFICANT CHANGE UP (ref 10–45)
ANION GAP SERPL CALC-SCNC: 13 MMOL/L — SIGNIFICANT CHANGE UP (ref 5–17)
AST SERPL-CCNC: 21 U/L — SIGNIFICANT CHANGE UP (ref 10–40)
BILIRUB SERPL-MCNC: 0.2 MG/DL — SIGNIFICANT CHANGE UP (ref 0.2–1.2)
BUN SERPL-MCNC: 9 MG/DL — SIGNIFICANT CHANGE UP (ref 7–23)
CALCIUM SERPL-MCNC: 9 MG/DL — SIGNIFICANT CHANGE UP (ref 8.4–10.5)
CHLORIDE SERPL-SCNC: 106 MMOL/L — SIGNIFICANT CHANGE UP (ref 96–108)
CO2 SERPL-SCNC: 25 MMOL/L — SIGNIFICANT CHANGE UP (ref 22–31)
CREAT SERPL-MCNC: 1.08 MG/DL — SIGNIFICANT CHANGE UP (ref 0.5–1.3)
GLUCOSE SERPL-MCNC: 91 MG/DL — SIGNIFICANT CHANGE UP (ref 70–99)
HCT VFR BLD CALC: 33.4 % — LOW (ref 39–50)
HGB BLD-MCNC: 11.8 G/DL — LOW (ref 13–17)
MCHC RBC-ENTMCNC: 30.6 PG — SIGNIFICANT CHANGE UP (ref 27–34)
MCHC RBC-ENTMCNC: 35.5 GM/DL — SIGNIFICANT CHANGE UP (ref 32–36)
MCV RBC AUTO: 86.4 FL — SIGNIFICANT CHANGE UP (ref 80–100)
PLATELET # BLD AUTO: 232 K/UL — SIGNIFICANT CHANGE UP (ref 150–400)
POTASSIUM SERPL-MCNC: 4.2 MMOL/L — SIGNIFICANT CHANGE UP (ref 3.5–5.3)
POTASSIUM SERPL-SCNC: 4.2 MMOL/L — SIGNIFICANT CHANGE UP (ref 3.5–5.3)
PROT SERPL-MCNC: 6.5 G/DL — SIGNIFICANT CHANGE UP (ref 6–8.3)
RBC # BLD: 3.86 M/UL — LOW (ref 4.2–5.8)
RBC # FLD: 10.9 % — SIGNIFICANT CHANGE UP (ref 10.3–14.5)
SODIUM SERPL-SCNC: 144 MMOL/L — SIGNIFICANT CHANGE UP (ref 135–145)
WBC # BLD: 7.5 K/UL — SIGNIFICANT CHANGE UP (ref 3.8–10.5)
WBC # FLD AUTO: 7.5 K/UL — SIGNIFICANT CHANGE UP (ref 3.8–10.5)

## 2017-04-24 RX ORDER — PANTOPRAZOLE SODIUM 20 MG/1
1 TABLET, DELAYED RELEASE ORAL
Qty: 30 | Refills: 0 | OUTPATIENT
Start: 2017-04-24 | End: 2017-05-24

## 2017-04-24 RX ORDER — OXYCODONE HYDROCHLORIDE 5 MG/1
1 TABLET ORAL
Qty: 30 | Refills: 0 | OUTPATIENT
Start: 2017-04-24 | End: 2017-04-29

## 2017-04-24 RX ORDER — LOSARTAN POTASSIUM 100 MG/1
1 TABLET, FILM COATED ORAL
Qty: 0 | Refills: 0 | COMMUNITY
Start: 2017-04-24

## 2017-04-24 RX ORDER — ACETAMINOPHEN 500 MG
2 TABLET ORAL
Qty: 0 | Refills: 0 | COMMUNITY
Start: 2017-04-24

## 2017-04-24 RX ORDER — PANTOPRAZOLE SODIUM 20 MG/1
40 TABLET, DELAYED RELEASE ORAL
Qty: 0 | Refills: 0 | Status: DISCONTINUED | OUTPATIENT
Start: 2017-04-24 | End: 2017-04-24

## 2017-04-24 RX ADMIN — Medication 100 GRAM(S): at 06:37

## 2017-04-24 RX ADMIN — PANTOPRAZOLE SODIUM 40 MILLIGRAM(S): 20 TABLET, DELAYED RELEASE ORAL at 09:29

## 2017-04-24 RX ADMIN — OXYCODONE HYDROCHLORIDE 10 MILLIGRAM(S): 5 TABLET ORAL at 06:58

## 2017-04-24 RX ADMIN — OXYCODONE HYDROCHLORIDE 10 MILLIGRAM(S): 5 TABLET ORAL at 07:29

## 2017-04-25 ENCOUNTER — APPOINTMENT (OUTPATIENT)
Dept: TRAUMA SURGERY | Facility: CLINIC | Age: 37
End: 2017-04-25

## 2017-04-25 VITALS
HEIGHT: 67 IN | SYSTOLIC BLOOD PRESSURE: 149 MMHG | TEMPERATURE: 98.9 F | DIASTOLIC BLOOD PRESSURE: 104 MMHG | HEART RATE: 71 BPM

## 2017-04-25 PROBLEM — Z00.00 ENCOUNTER FOR PREVENTIVE HEALTH EXAMINATION: Status: ACTIVE | Noted: 2017-04-25

## 2017-04-25 RX ORDER — LOSARTAN POTASSIUM AND HYDROCHLOROTHIAZIDE 100; 25 MG/1; MG/1
100-25 TABLET, FILM COATED ORAL
Refills: 0 | Status: DISCONTINUED | COMMUNITY

## 2017-04-25 RX ORDER — AMOXICILLIN AND CLAVULANATE POTASSIUM 875; 125 MG/1; MG/1
875-125 TABLET, COATED ORAL
Refills: 0 | Status: ACTIVE | COMMUNITY

## 2017-04-25 RX ORDER — OXYCODONE HYDROCHLORIDE 5 MG/1
5 CAPSULE ORAL
Refills: 0 | Status: ACTIVE | COMMUNITY

## 2017-04-25 RX ORDER — LOSARTAN POTASSIUM AND HYDROCHLOROTHIAZIDE 25; 100 MG/1; MG/1
100-25 TABLET ORAL DAILY
Refills: 0 | Status: ACTIVE | COMMUNITY
Start: 2017-04-25

## 2017-04-28 ENCOUNTER — APPOINTMENT (OUTPATIENT)
Dept: TRAUMA SURGERY | Facility: CLINIC | Age: 37
End: 2017-04-28

## 2017-04-28 VITALS
HEIGHT: 67 IN | SYSTOLIC BLOOD PRESSURE: 115 MMHG | DIASTOLIC BLOOD PRESSURE: 80 MMHG | WEIGHT: 180 LBS | BODY MASS INDEX: 28.25 KG/M2 | HEART RATE: 105 BPM | TEMPERATURE: 98.8 F

## 2018-05-17 RX ORDER — LOSARTAN POTASSIUM 100 MG/1
0 TABLET, FILM COATED ORAL
Qty: 0 | Refills: 0 | COMMUNITY

## 2019-05-29 NOTE — ED ADULT TRIAGE NOTE - SOURCE OF INFORMATION
[address] ,  [address] [facility]  Phone: [phone]    CHIEF COMPLAINT     Here for screening colonoscopy. CHARLA Vazquez is a 76 y.o. female who presents for screening colonoscopy. Denies GI symptoms. Denies family history of colon cancer in first degree family members. She states she has never had a colonoscopy before.     PAST MEDICAL HISTORY     Past Medical History:   Diagnosis Date    Hypertension      FAMILY HISTORY     Family History   Problem Relation Age of Onset    Breast Cancer Mother     High Blood Pressure Mother     Arthritis Mother      SOCIAL HISTORY     Social History     Socioeconomic History    Marital status:      Spouse name: Not on file    Number of children: Not on file    Years of education: 15    Highest education level: Not on file   Occupational History    Occupation: baker     Comment: [employer]   Social Needs    Financial resource strain: Not on file    Food insecurity:     Worry: Not on file     Inability: Not on file   InCorta needs:     Medical: Not on file     Non-medical: Not on file   Tobacco Use    Smoking status: Never Smoker    Smokeless tobacco: Never Used   Substance and Sexual Activity    Alcohol use: No     Alcohol/week: 0.0 oz    Drug use: No    Sexual activity: Yes     Partners: Male   Lifestyle    Physical activity:     Days per week: Not on file     Minutes per session: Not on file    Stress: Not on file   Relationships    Social connections:     Talks on phone: Not on file     Gets together: Not on file     Attends Baptism service: Not on file     Active member of club or organization: Not on file     Attends meetings of clubs or organizations: Not on file     Relationship status: Not on file    Intimate partner violence:     Fear of current or ex partner: Not on file     Emotionally abused: Not on file     Physically abused: Not on file     Forced sexual activity: Not on file   Other Topics Concern    Not on file   Social History Narrative    Not on file     SURGICAL HISTORY     Past Surgical History:   Procedure Laterality Date     SECTION      x2     CURRENT MEDICATIONS     No current facility-administered medications on file prior to encounter. No current outpatient medications on file prior to encounter. ALLERGIES   No Known Allergies  IMMUNIZATIONS     Immunization History   Administered Date(s) Administered    Influenza, High Dose (Fluzone 65 yrs and older) 10/26/2017, 10/03/2018    Pneumococcal 13-valent Conjugate ([id number]) 10/26/2017    Pneumococcal Polysaccharide ([id number]) 10/03/2018    Tdap (Boostrix, Adacel) 08/10/2012     REVIEW OF SYSTEMS     Constitutional: Negative for appetite change, chills, fatigue, fever and unexpected weight change. HENT: Negative for ear pain, hearing loss and nosebleeds. Eyes: Negative for pain and visual disturbance. Respiratory: Negative for cough, shortness of breath and wheezing. Cardiovascular: Negative for chest pain, palpitations and leg swelling. Gastrointestinal: see HPI for details. Endocrine: Negative for polydipsia, polyphagia and polyuria. Genitourinary: Negative for difficulty urinating, dysuria, hematuria and urgency. Musculoskeletal: Positive for arthralgias and back pain. Skin: Negative for pallor and rash. Allergic/Immunologic: Negative for environmental allergies and immunocompromised state. Neurological: Negative for seizures, syncope and numbness. Hematological: Negative for adenopathy. Does not bruise/bleed easily. Psychiatric/Behavioral: Negative for agitation, confusion, hallucinations and suicidal ideas.    PHYSICAL EXAM   BP (!) 169/74   Pulse 64   Temp 97.4 °F (36.3 °C) (Temporal)   Resp 16   Ht 4' 10\" (1.473 m)   Wt 118 lb (53.5 kg)   SpO2 100%   BMI 24.66 kg/m²   Wt Readings from Last 3 Encounters:   19 118 lb (53.5 kg)   19 115 lb (52.2 kg)   04/25/19 118 lb (53.5 kg)     Constitutional: AAO3, No acute distress  HEENT: no pallor or icterus. Cardiovascular: Normal heart rate, Normal rhythm, No murmurs,. RS: Normal breath sounds, No wheezing,   Abdomen: soft, non tender, not distended, Bs+. No hepatosplenomegaly. Extremities:  No edema. FINAL IMPRESSION   Proceed with colonoscopy for screening   Sedation plan: moderate  Procedure discussed with patient in detail including risks and benefits. Anesthesia risks, risk of perforation, bleeding discussed with the patient. Patient

## 2019-07-25 NOTE — ED ADULT TRIAGE NOTE - ESI TRIAGE ACUITY LEVEL, MLM
SPIRITUAL HEALTH SERVICES Progress Note  [facility] ACP note    SH consulted for assistance with ACP conversation facilitation.   Met with pt, Mercedes, who is familiar to me from previous  visits.   Mercedes indicates she would like information only at this time regarding HCD/ACP documents.   Provided verbal overview and supportive written materials per her request.   Encouraged pt to complete documents within the next 2 weeks.   No other needs expressed at this time.     BRIAN Turcios.  Staff    Pager #[phone]     
3

## 2020-06-15 NOTE — ED ADULT NURSE REASSESSMENT NOTE - GASTROINTESTINAL WDL
1. Patient's PA for Viibryd was denied. Patient must have tried and failed a generic SSRI or SNRI. Patient has only been on Trintellix (samples) then PA denied for that and was changed to [address] [place].   Patient also sent a My Chart message that included his f
Fax received from [facility] that patient's Viibryd and Tacrolimus require PA.  PA's completed on Cover My Meds for patient. PA's are pending review. Will johnathan for follow-up.
Filled out PA request form with updated info below and attached clinicals. Faxed to [employer] at [phone]. Will johnathan for f/u.
Tacrolimus 0.1% ointment was approved from 6/11/20-6/11/2021.     CASEID# [id number]    Pharmacy notified
Will Rx Sertraline instead 100 mg daily  Pls let patient know of change  Patient will need follow up in 3 months    Tacrolimus to scalp and facial areas as needed
Abdomen soft, nontender, nondistended, bowel sounds present in all 4 quadrants.
Abdomen soft, nontender, nondistended, bowel sounds present in all 4 quadrants.

## 2021-03-17 NOTE — ED PROVIDER NOTE - OBJECTIVE STATEMENT
18
38yo M with sudden onset abd pain 11pm, intermittent getting worse, sharp, rt abd nonradiating, no flank pain. no LE pain/paresthesias. never had before. no n/v/d. had CP over the weekend that resolved. h/o HTN only. no h/o renal stones. no SOB/dizziness. no urinary sx     ROS: no CP/SOB. no cough. no nausea, vomiting, diarrhea, constipation. no rash. no bleeding. no urinary complaints. no weakness. no vision changes. no HA. no neck/back pain. no extremity swelling.

## 2022-01-18 NOTE — H&P ADULT. - I WAS PHYSICALLY PRESENT FOR THE KEY PORTIONS OF THE EVALUATION AND MANAGEMENT (E/M) SERVICE PROVIDED.  I AGREE WITH THE ABOVE HISTORY, PHYSICAL, AND PLAN WHICH I HAVE REVIEWED AND EDITED WHERE APPROPRIATE
Cross coverage for the [facility]    Spoke with nurse Ms. Merlos via phone for follow up anticoagulation visit.   Last INR on 1/10 was 1.5.  Dose increased.   Today's INR is 1.7 and is below goal range.    Current warfarin total weekly dose of 33 mg verified.  Informed the INR result is below therapeutic range and instructed to increase current dose per protocol.  Dosing instructions called to pharmacist Lupillo at \[facility]". Discussed dose and return date of 1/25 for next INR. See Anticoagulation flowsheet.    Dr. Kenney is in the office today supervising the treatment. Note forwarded for review.    Call your physician immediately if you notice any of the following symptoms of a blood clot:   · Sudden weakness in any limb  · Numbness or tingling anywhere  · Visual changes or loss of sight in either eye  · Sudden onset of slurred speech or inability to speak  · Dizziness or faintness  · New pain, swelling, redness or heat in any extremity  · New SOB or chest pain  Symptoms associated with blood clotting/low INR reviewed and verbalizes understanding.    Instructed to contact the clinic with any unusual bleeding or bruising, any changes in medications, diet, health status, lifestyle, or any other changes, questions or concerns. Verbalized understanding of all discussed.     
External INR result from [facility], result of 1.7. Goal range should be 2.0-3.0.   
Statement Selected

## 2023-08-09 NOTE — ED ADULT NURSE NOTE - TEMPLATE LIST FOR HEAD TO TOE ASSESSMENT
SW informed that patient will be admitted to [address] [place] [address] today.   Confirmed by admissions team.  Informed charge nurse of this information and need for discharge order from the hospitalist.    Electronically signed by ALISHA Brownlee, MARNI on 8/9/2023 at 3:01 PM Abdominal Pain, N/V/D

## 2024-03-22 ENCOUNTER — EMERGENCY (EMERGENCY)
Facility: HOSPITAL | Age: 44
LOS: 1 days | Discharge: HOME | End: 2024-03-22
Attending: EMERGENCY MEDICINE
Payer: COMMERCIAL

## 2024-03-22 VITALS
TEMPERATURE: 98 F | HEART RATE: 71 BPM | RESPIRATION RATE: 24 BRPM | SYSTOLIC BLOOD PRESSURE: 137 MMHG | DIASTOLIC BLOOD PRESSURE: 92 MMHG | OXYGEN SATURATION: 97 %

## 2024-03-22 DIAGNOSIS — Z98.890 OTHER SPECIFIED POSTPROCEDURAL STATES: Chronic | ICD-10-CM

## 2024-03-22 LAB
ALBUMIN SERPL ELPH-MCNC: 4.7 G/DL — SIGNIFICANT CHANGE UP (ref 3.3–5)
ALP SERPL-CCNC: 98 U/L — SIGNIFICANT CHANGE UP (ref 40–120)
ALT FLD-CCNC: 29 U/L — SIGNIFICANT CHANGE UP (ref 10–45)
ANION GAP SERPL CALC-SCNC: 18 MMOL/L — HIGH (ref 5–17)
AST SERPL-CCNC: 16 U/L — SIGNIFICANT CHANGE UP (ref 10–40)
BASE EXCESS BLDV CALC-SCNC: 1.8 MMOL/L — SIGNIFICANT CHANGE UP (ref -2–3)
BASOPHILS # BLD AUTO: 0.09 K/UL — SIGNIFICANT CHANGE UP (ref 0–0.2)
BASOPHILS NFR BLD AUTO: 1 % — SIGNIFICANT CHANGE UP (ref 0–2)
BILIRUB SERPL-MCNC: 0.4 MG/DL — SIGNIFICANT CHANGE UP (ref 0.2–1.2)
BUN SERPL-MCNC: 16 MG/DL — SIGNIFICANT CHANGE UP (ref 7–23)
CA-I SERPL-SCNC: 1.19 MMOL/L — SIGNIFICANT CHANGE UP (ref 1.15–1.33)
CALCIUM SERPL-MCNC: 10.1 MG/DL — SIGNIFICANT CHANGE UP (ref 8.4–10.5)
CHLORIDE BLDV-SCNC: 103 MMOL/L — SIGNIFICANT CHANGE UP (ref 96–108)
CHLORIDE SERPL-SCNC: 103 MMOL/L — SIGNIFICANT CHANGE UP (ref 96–108)
CO2 BLDV-SCNC: 29 MMOL/L — HIGH (ref 22–26)
CO2 SERPL-SCNC: 22 MMOL/L — SIGNIFICANT CHANGE UP (ref 22–31)
CREAT SERPL-MCNC: 0.82 MG/DL — SIGNIFICANT CHANGE UP (ref 0.5–1.3)
EGFR: 110 ML/MIN/1.73M2 — SIGNIFICANT CHANGE UP
EOSINOPHIL # BLD AUTO: 0.05 K/UL — SIGNIFICANT CHANGE UP (ref 0–0.5)
EOSINOPHIL NFR BLD AUTO: 0.5 % — SIGNIFICANT CHANGE UP (ref 0–6)
GAS PNL BLDV: 136 MMOL/L — SIGNIFICANT CHANGE UP (ref 136–145)
GAS PNL BLDV: SIGNIFICANT CHANGE UP
GLUCOSE BLDV-MCNC: 171 MG/DL — HIGH (ref 70–99)
GLUCOSE SERPL-MCNC: 165 MG/DL — HIGH (ref 70–99)
HCO3 BLDV-SCNC: 28 MMOL/L — SIGNIFICANT CHANGE UP (ref 22–29)
HCT VFR BLD CALC: 46.5 % — SIGNIFICANT CHANGE UP (ref 39–50)
HCT VFR BLDA CALC: 51 % — SIGNIFICANT CHANGE UP (ref 39–51)
HGB BLD CALC-MCNC: 17.1 G/DL — SIGNIFICANT CHANGE UP (ref 12.6–17.4)
HGB BLD-MCNC: 16.1 G/DL — SIGNIFICANT CHANGE UP (ref 13–17)
IMM GRANULOCYTES NFR BLD AUTO: 0.5 % — SIGNIFICANT CHANGE UP (ref 0–0.9)
LACTATE BLDV-MCNC: 2.8 MMOL/L — HIGH (ref 0.5–2)
LIDOCAIN IGE QN: 37 U/L — SIGNIFICANT CHANGE UP (ref 7–60)
LYMPHOCYTES # BLD AUTO: 2.09 K/UL — SIGNIFICANT CHANGE UP (ref 1–3.3)
LYMPHOCYTES # BLD AUTO: 22.1 % — SIGNIFICANT CHANGE UP (ref 13–44)
MCHC RBC-ENTMCNC: 29.3 PG — SIGNIFICANT CHANGE UP (ref 27–34)
MCHC RBC-ENTMCNC: 34.6 GM/DL — SIGNIFICANT CHANGE UP (ref 32–36)
MCV RBC AUTO: 84.5 FL — SIGNIFICANT CHANGE UP (ref 80–100)
MONOCYTES # BLD AUTO: 0.5 K/UL — SIGNIFICANT CHANGE UP (ref 0–0.9)
MONOCYTES NFR BLD AUTO: 5.3 % — SIGNIFICANT CHANGE UP (ref 2–14)
NEUTROPHILS # BLD AUTO: 6.66 K/UL — SIGNIFICANT CHANGE UP (ref 1.8–7.4)
NEUTROPHILS NFR BLD AUTO: 70.6 % — SIGNIFICANT CHANGE UP (ref 43–77)
NRBC # BLD: 0 /100 WBCS — SIGNIFICANT CHANGE UP (ref 0–0)
PCO2 BLDV: 47 MMHG — SIGNIFICANT CHANGE UP (ref 42–55)
PH BLDV: 7.38 — SIGNIFICANT CHANGE UP (ref 7.32–7.43)
PLATELET # BLD AUTO: 276 K/UL — SIGNIFICANT CHANGE UP (ref 150–400)
PO2 BLDV: 47 MMHG — HIGH (ref 25–45)
POTASSIUM BLDV-SCNC: 3.3 MMOL/L — LOW (ref 3.5–5.1)
POTASSIUM SERPL-MCNC: 3.5 MMOL/L — SIGNIFICANT CHANGE UP (ref 3.5–5.3)
POTASSIUM SERPL-SCNC: 3.5 MMOL/L — SIGNIFICANT CHANGE UP (ref 3.5–5.3)
PROT SERPL-MCNC: 8.2 G/DL — SIGNIFICANT CHANGE UP (ref 6–8.3)
RBC # BLD: 5.5 M/UL — SIGNIFICANT CHANGE UP (ref 4.2–5.8)
RBC # FLD: 12 % — SIGNIFICANT CHANGE UP (ref 10.3–14.5)
SAO2 % BLDV: 82.7 % — SIGNIFICANT CHANGE UP (ref 67–88)
SODIUM SERPL-SCNC: 143 MMOL/L — SIGNIFICANT CHANGE UP (ref 135–145)
TROPONIN T, HIGH SENSITIVITY RESULT: <6 NG/L — SIGNIFICANT CHANGE UP (ref 0–51)
WBC # BLD: 9.44 K/UL — SIGNIFICANT CHANGE UP (ref 3.8–10.5)
WBC # FLD AUTO: 9.44 K/UL — SIGNIFICANT CHANGE UP (ref 3.8–10.5)

## 2024-03-22 PROCEDURE — 74177 CT ABD & PELVIS W/CONTRAST: CPT | Mod: 26,MC

## 2024-03-22 PROCEDURE — 99285 EMERGENCY DEPT VISIT HI MDM: CPT

## 2024-03-22 RX ORDER — ONDANSETRON 8 MG/1
4 TABLET, FILM COATED ORAL ONCE
Refills: 0 | Status: COMPLETED | OUTPATIENT
Start: 2024-03-22 | End: 2024-03-22

## 2024-03-22 RX ORDER — ACETAMINOPHEN 500 MG
1000 TABLET ORAL ONCE
Refills: 0 | Status: COMPLETED | OUTPATIENT
Start: 2024-03-22 | End: 2024-03-22

## 2024-03-22 RX ORDER — MORPHINE SULFATE 50 MG/1
4 CAPSULE, EXTENDED RELEASE ORAL ONCE
Refills: 0 | Status: DISCONTINUED | OUTPATIENT
Start: 2024-03-22 | End: 2024-03-22

## 2024-03-22 RX ORDER — SODIUM CHLORIDE 9 MG/ML
1000 INJECTION INTRAMUSCULAR; INTRAVENOUS; SUBCUTANEOUS ONCE
Refills: 0 | Status: COMPLETED | OUTPATIENT
Start: 2024-03-22 | End: 2024-03-22

## 2024-03-22 RX ADMIN — Medication 400 MILLIGRAM(S): at 22:52

## 2024-03-22 RX ADMIN — SODIUM CHLORIDE 1000 MILLILITER(S): 9 INJECTION INTRAMUSCULAR; INTRAVENOUS; SUBCUTANEOUS at 22:52

## 2024-03-22 RX ADMIN — ONDANSETRON 4 MILLIGRAM(S): 8 TABLET, FILM COATED ORAL at 22:52

## 2024-03-22 RX ADMIN — MORPHINE SULFATE 4 MILLIGRAM(S): 50 CAPSULE, EXTENDED RELEASE ORAL at 22:52

## 2024-03-22 NOTE — ED ADULT NURSE NOTE - NSICDXPASTMEDICALHX_GEN_ALL_CORE_FT
PAST MEDICAL HISTORY:  HLD (hyperlipidemia)     Hypertension     No pertinent past medical history

## 2024-03-22 NOTE — ED ADULT NURSE NOTE - OBJECTIVE STATEMENT
44y M A&ox4 BIBEMS c/o right sided abdominal pain. As per EMS, pt had the sudden onset of right sided abdominal. Pt had similar episode a few months ago and went to [facility] where he got pain medication and felt better. Today pt went back to [facility] and they called an ambulance. 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 44y M A&ox4 BIBEMS c/o right sided abdominal pain. As per EMS, pt had the sudden onset of right sided abdominal. Pt had similar episode a few months ago and went to [facility] where he got pain medication and felt better. Today pt went back to [facility] and they called an ambulance. Pt states he was at [facility] and picked up a passenger when the pain started. Pt states the pain was not bad but got worse after dropping the passenger off. Pt then went to [facility] where they called EMS. Upon assessment pt hunched over c/o of sharp right sided heaviness/pressure that does not radiate. Denies any trauma/injury, blood in urine or stool, burning on urination, fever, cough, N/V/D/CP/SONB/Gi/Gu symptoms. PMH of HTN and HLD. PSH of cholecystectomy, Right wrist sx, left knee sx and spine sx. VSS

## 2024-03-22 NOTE — ED ADULT NURSE NOTE - SEPSIS REFERENCE DATA CRITERIA 2
Pt concerned that her prescription wasn't filled.  There was a duplicate request for the same medication.  One was fulfilled and the other one was declined.    Abnormal Lactate: > 2

## 2024-03-23 VITALS
TEMPERATURE: 98 F | DIASTOLIC BLOOD PRESSURE: 88 MMHG | SYSTOLIC BLOOD PRESSURE: 137 MMHG | HEART RATE: 68 BPM | OXYGEN SATURATION: 96 % | RESPIRATION RATE: 19 BRPM

## 2024-03-23 PROBLEM — I10 ESSENTIAL (PRIMARY) HYPERTENSION: Chronic | Status: ACTIVE | Noted: 2017-04-19

## 2024-03-23 LAB
APPEARANCE UR: CLEAR — SIGNIFICANT CHANGE UP
BILIRUB UR-MCNC: NEGATIVE — SIGNIFICANT CHANGE UP
COLOR SPEC: YELLOW — SIGNIFICANT CHANGE UP
DIFF PNL FLD: NEGATIVE — SIGNIFICANT CHANGE UP
GLUCOSE UR QL: NEGATIVE MG/DL — SIGNIFICANT CHANGE UP
KETONES UR-MCNC: NEGATIVE MG/DL — SIGNIFICANT CHANGE UP
LEUKOCYTE ESTERASE UR-ACNC: NEGATIVE — SIGNIFICANT CHANGE UP
NITRITE UR-MCNC: NEGATIVE — SIGNIFICANT CHANGE UP
PH UR: 7 — SIGNIFICANT CHANGE UP (ref 5–8)
PROT UR-MCNC: NEGATIVE MG/DL — SIGNIFICANT CHANGE UP
SP GR SPEC: >1.03 — HIGH (ref 1–1.03)
UROBILINOGEN FLD QL: 0.2 MG/DL — SIGNIFICANT CHANGE UP (ref 0.2–1)

## 2024-03-23 PROCEDURE — 82803 BLOOD GASES ANY COMBINATION: CPT

## 2024-03-23 PROCEDURE — 82435 ASSAY OF BLOOD CHLORIDE: CPT

## 2024-03-23 PROCEDURE — 85014 HEMATOCRIT: CPT

## 2024-03-23 PROCEDURE — 87086 URINE CULTURE/COLONY COUNT: CPT

## 2024-03-23 PROCEDURE — 80053 COMPREHEN METABOLIC PANEL: CPT

## 2024-03-23 PROCEDURE — 81003 URINALYSIS AUTO W/O SCOPE: CPT

## 2024-03-23 PROCEDURE — 84484 ASSAY OF TROPONIN QUANT: CPT

## 2024-03-23 PROCEDURE — 74177 CT ABD & PELVIS W/CONTRAST: CPT | Mod: MC

## 2024-03-23 PROCEDURE — 93005 ELECTROCARDIOGRAM TRACING: CPT

## 2024-03-23 PROCEDURE — 82947 ASSAY GLUCOSE BLOOD QUANT: CPT

## 2024-03-23 PROCEDURE — 84132 ASSAY OF SERUM POTASSIUM: CPT

## 2024-03-23 PROCEDURE — 83690 ASSAY OF LIPASE: CPT

## 2024-03-23 PROCEDURE — 96375 TX/PRO/DX INJ NEW DRUG ADDON: CPT

## 2024-03-23 PROCEDURE — 83605 ASSAY OF LACTIC ACID: CPT

## 2024-03-23 PROCEDURE — 84295 ASSAY OF SERUM SODIUM: CPT

## 2024-03-23 PROCEDURE — 85018 HEMOGLOBIN: CPT

## 2024-03-23 PROCEDURE — 96374 THER/PROPH/DIAG INJ IV PUSH: CPT | Mod: XU

## 2024-03-23 PROCEDURE — 99285 EMERGENCY DEPT VISIT HI MDM: CPT | Mod: 25

## 2024-03-23 PROCEDURE — 82330 ASSAY OF CALCIUM: CPT

## 2024-03-23 PROCEDURE — 85025 COMPLETE CBC W/AUTO DIFF WBC: CPT

## 2024-03-23 NOTE — ED PROVIDER NOTE - NSFOLLOWUPCLINICS_GEN_ALL_ED_FT
Gastroenterology at [facility]  Gastroenterology  [address] [place] [address]  Phone: [phone]  Fax:     [facility]  Gastroenterology  [address] [place] [address]  Phone: [phone]  Fax:

## 2024-03-23 NOTE — ED PROVIDER NOTE - PHYSICAL EXAMINATION
GENERAL: NAD  HEAD: normocephalic, atraumatic  HEENT: normal conjunctiva, oral mucosa moist, uvula midline, neck supple  CARDIAC: regular rate and rhythm, normal S1S2, no appreciable murmurs  PULM: speaking in full sentences, normal breath sounds, clear to ascultation bilaterally, no rales, rhonchi, wheezing  GI: abdomen nondistended, soft, right sided ttp  : no CVA tenderness b/l, no suprapubic tenderness  NEURO: moving all 4 extremities, no focal deficits, normal speech, AOx3  MSK: no peripheral edema, no calf tenderness b/l  SKIN: well-perfused, extremities warm  PSYCH: appropriate mood and affect

## 2024-03-23 NOTE — ED PROVIDER NOTE - NSFOLLOWUPINSTRUCTIONS_ED_ALL_ED_FT
Today in the emergency department your evaluated for abdominal pain.  We did lab work, CT scan, urine study that did not show any acute reason for your abdominal pain.  Please follow-up with a gastroenterologist in their office for further testing.      Please follow up with your primary care physician within 1-2 weeks of discharge from the emergency department.  Please bring a copy of your results with you.  Please return to the emergency department for worsening of your symptoms.    You may take Acetaminophen over the counter as needed for pain and/or fever. Use as directed and see medication warnings.  You may take Ibuprofen over the counter as needed for pain and/or fever. Use as directed and see medication warnings.    DISCHARGE INSTRUCTIONS:  Seek care immediately if:  You vomit blood or cannot stop vomiting.  You have blood in your bowel movement or it looks like tar.  You have bleeding from your rectum.  Your abdomen is larger than usual, more painful, and hard.  You have severe pain in your abdomen.  You stop passing gas and having bowel movements.  You feel weak, dizzy, or faint.    Contact your healthcare provider if:  You have a fever.  You have new signs and symptoms.  Your symptoms do not get better with treatment.  You have questions or concerns about your condition or care.

## 2024-03-23 NOTE — ED PROVIDER NOTE - OBJECTIVE STATEMENT
44-year-old male with a history of tobacco use, HTN, back pain on gabapentin, s/p cholecystectomy presenting with right lower quadrant 10 out of 10 abdominal pain.  Patient reports that he has had a similar pain in the past and his primary care doctor gave him some sort of medication for it.  Patient reports that he was driving earlier today and he had sudden onset right lower quadrant abdominal pain that did not improve and he presented to the ED for further evaluation.  It is not associated with any nausea, vomiting, diarrhea.  He otherwise has no significant headache, vision changes, lightheadedness, dizziness, chest pain, shortness of breath, dysuria, peripheral edema, fever/chills.  NKDA.  Current everyday smoker.

## 2024-03-23 NOTE — ED PROVIDER NOTE - PROGRESS NOTE DETAILS
Attending Karlie:  pt re evaluated after labs/ct. labs unrmearkable as was urine, pt has possible retained stone in cystic duct, anthony was 2 years ago as per pt, states pain is just about gone, was in epigastric area then also points to rlq however is gone, no lower abd ttp, pos epi ttp, no ruq ttp, neg ruelas, no r/r/g, no fever, no lft's, pain improved, do feel pt is stable for outpt fu, will have fu w/ gi. Lab work grossly unremarkable, lactate 2.8.  Given 1 L IVF.  CT abdomen pelvis without evidence of acute intra-abdominal or pelvic pathology.  Possible cystic duct remnant.  UA without evidence of acute urinary tract infection.  Patient pain improved after IVF, Ofirmev, morphine.  Dispo to home with PMD and gastroenterology follow-up.

## 2024-03-23 NOTE — ED PROVIDER NOTE - CLINICAL SUMMARY MEDICAL DECISION MAKING FREE TEXT BOX
44-year-old male with a history of tobacco use, HTN, back pain on gabapentin, s/p cholecystectomy presenting with right lower quadrant 10 out of 10 abdominal pain. Concern for acute appendicitis versus colitis versus kidney stone.  Labs, EKG, CT A/P, UA/urine culture ordered.  IVF, Ofirmev, morphine ordered. 44-year-old male with a history of tobacco use, HTN, back pain on gabapentin, s/p cholecystectomy presenting with right lower quadrant 10 out of 10 abdominal pain. Concern for acute appendicitis versus colitis versus kidney stone.  Labs, EKG, CT A/P, UA/urine culture ordered.  IVF, Ofirmev, morphine ordered.    GERMAN Mondragon MD: Agree with resident/ACP MDM, assessment and plan as above.

## 2024-03-24 LAB
CULTURE RESULTS: NO GROWTH — SIGNIFICANT CHANGE UP
SPECIMEN SOURCE: SIGNIFICANT CHANGE UP
